# Patient Record
Sex: FEMALE | Race: WHITE | NOT HISPANIC OR LATINO | Employment: FULL TIME | URBAN - METROPOLITAN AREA
[De-identification: names, ages, dates, MRNs, and addresses within clinical notes are randomized per-mention and may not be internally consistent; named-entity substitution may affect disease eponyms.]

---

## 2018-08-16 ENCOUNTER — TRANSCRIBE ORDERS (OUTPATIENT)
Dept: PHYSICAL THERAPY | Facility: CLINIC | Age: 38
End: 2018-08-16

## 2018-08-16 ENCOUNTER — TREATMENT (OUTPATIENT)
Dept: PHYSICAL THERAPY | Facility: CLINIC | Age: 38
End: 2018-08-16

## 2018-08-16 DIAGNOSIS — M53.3 SI (SACROILIAC) JOINT DYSFUNCTION: Primary | ICD-10-CM

## 2018-08-16 DIAGNOSIS — M54.50 CHRONIC BILATERAL LOW BACK PAIN WITHOUT SCIATICA: Primary | ICD-10-CM

## 2018-08-16 DIAGNOSIS — G89.29 CHRONIC BILATERAL LOW BACK PAIN WITHOUT SCIATICA: Primary | ICD-10-CM

## 2018-08-16 PROCEDURE — 97161 PT EVAL LOW COMPLEX 20 MIN: CPT | Performed by: PHYSICAL THERAPIST

## 2018-08-16 PROCEDURE — 97110 THERAPEUTIC EXERCISES: CPT | Performed by: PHYSICAL THERAPIST

## 2018-08-16 NOTE — PROGRESS NOTES
Physical Therapy Initial Evaluation and Plan of Care      Patient: Antonieta Soto   : 1980  Diagnosis/ICD-10 Code:  SI (sacroiliac) joint dysfunction [M53.3]  Referring practitioner: Betsy Moser MD  Date of Initial Visit: 2018  Today's Date: 2018  Patient seen for 1 sessions           Subjective Questionnaire:Oswestry 2/50 (4%)      Subjective Evaluation    History of Present Illness  Mechanism of injury: Pain in gluteal/SI area/coccyx since an injury 15 years ago   Went to [facility] and got muscle relaxers   Has decreased the stress in her life and this has helped too  No pain today but the pain is intermittent throughout the month and worst around her menstrual cycle  The pain moves around her pelvic area    Subjective comment: L gluteal area/ SI/coccyx pain  Patient Occupation: full time grad student Quality of life: good    Pain  Current pain ratin  At best pain ratin  At worst pain ratin  Location: L gluteal area  Quality: dull ache and throbbing  Relieving factors: medications  Aggravating factors: standing, squatting and ambulation    Diagnostic Tests  X-ray: normal    Treatments  Previous treatment: medication  Current treatment: physical therapy  Patient Goals  Patient goals for therapy: decreased pain             Objective       Static Posture     Comments  L ASIS low, PSIS high, illiac crest high  L med malleolus low  Leg length 83 cm L and 82 cm R    Palpation     Additional Palpation Details  No tenderness to palpation    Lumbar Screen  Lumbar range of motion within normal limits.    Neurological Testing     Sensation     Hip   Left Hip   Intact: light touch    Right Hip   Intact: light touch    Active Range of Motion   Left Hip   Normal active range of motion    Right Hip   Normal active range of motion    Joint Play   Left Hip   Joints within functional limits are the posterior hip capsule, anterior hip capsule, lateral hip capsule and long axis  distraction.     Right Hip   Joints within functional limits are the posterior hip capsule, anterior hip capsule, lateral hip capsule and long axis distraction.     Strength/Myotome Testing     Left Hip   Planes of Motion   Extension: 4  Abduction: 4-    Right Hip   Planes of Motion   Extension: 4+  Abduction: 4+    Tests     Left Hip   Negative RAGHU, Gillet's, Ash and SI compression.     Right Hip   Negative RAGHU, Gillet's, Ash and SI compression.     Additional Tests Details  Flexibility WNL    Ambulation     Observational Gait     Additional Observational Gait Details  Normal gait with slight hip IR B    Functional Assessment   Squat   Left tibial anterior translation beyond toes and right tibial anterior translation beyond toes.     General Comments     Hip Comments   L leg length 83 cm  R leg length 82 cm         Assessment & Plan     Assessment  Impairments: impaired physical strength, lacks appropriate home exercise program and pain with function  Assessment details: Pt presents with increased pain and a rotated pelvic innominate with no tenderness at this time but reports tailbone and buttocks pain primarily on the L when she does have the pain.   Her deficits are minimal but she does internally rotate at B hips in standing and has some hip abduction weakness.  Her leg length measures 1cm with L leg longer than R likely due to her pelvic ant innominate rotation L.  She would benefit from skilled PT to help set up approp HEP and manual therapy and there ex to address her deficits for alignment of the pelvis and improved hip strength.   Prognosis: good  Functional Limitations: walking, uncomfortable because of pain, moving in bed, sitting and stooping  Goals  Plan Goals: STG (in 2 weeks)  1) Pt will report no pain since SOC when daily completing her ex  2) Pt will be able to complete HEP with no increase in pain and with proper technique  3) Pt will have equal pelvic landmarks indicating successful  alignment of the pelvis after manual therapy techniques    LTG (in 4 weeks)  1) Pt will be able to engage in normal workout routine with no pain   2) Pt will demonstrate improved hip abd/ext strength to 4+/5 in L hip  in order to stabilize the pelvis     Plan  Therapy options: will be seen for skilled physical therapy services  Planned modality interventions: high voltage pulsed current (pain management), high voltage pulsed current (spasm management), ultrasound, TENS, electrical stimulation/Russian stimulation and thermotherapy (hydrocollator packs)  Planned therapy interventions: abdominal trunk stabilization, functional ROM exercises, flexibility, postural training, neuromuscular re-education, manual therapy, soft tissue mobilization, joint mobilization, home exercise program, stretching, strengthening, spinal/joint mobilization, body mechanics training and balance/weight-bearing training  Frequency: 2x week  Duration in weeks: 4        Manual Therapy:    6     mins  38180;  Therapeutic Exercise:    16     mins  47299;     Neuromuscular Nichole:    0    mins  88696;    Therapeutic Activity:     0     mins  54885;     Gait Trainin     mins  98245;     Ultrasound:     0     mins  78497;    Electrical Stimulation:    0     mins  96912 ( );  Dry Needling     0     mins self-pay    Timed Treatment:   22   mins   Total Treatment:     47   mins    PT SIGNATURE: Kaylin Araujo, PT   DATE TREATMENT INITIATED: 2018    Initial Certification  Certification Period: 2018  I certify that the therapy services are furnished while this patient is under my care.  The services outlined above are required by this patient, and will be reviewed every 90 days.     PHYSICIAN: Betsy Moser MD      DATE:     Please sign and return via fax to [phone].. Thank you, [facility].

## 2018-08-23 ENCOUNTER — TREATMENT (OUTPATIENT)
Dept: PHYSICAL THERAPY | Facility: CLINIC | Age: 38
End: 2018-08-23

## 2018-08-23 PROCEDURE — 97110 THERAPEUTIC EXERCISES: CPT | Performed by: PHYSICAL THERAPIST

## 2018-08-23 NOTE — PROGRESS NOTES
Physical Therapy Daily Progress Note        Patient: Antonieta Soto   : 1980  Diagnosis/ICD-10 Code:  No primary diagnosis found.  Referring practitioner: Betsy Moser MD  Date of Initial Visit: Type: THERAPY  Noted: 2018  Today's Date: 2018  Patient seen for 2 sessions               Subjective Pt states she has been feeling good and likes the exercises    Objective   See Exercise, Manual, and Modality Logs for complete treatment.       Assessment/Plan Pt's pelvis and saccral landmarks are alligned today so no manual therapy indicated.  New ex added to progress core strength.        Manual Therapy:    0     mins  74419;  Therapeutic Exercise:    46     mins  33303;     Neuromuscular Nichole:    0    mins  97208;    Therapeutic Activity:     0     mins  66562;     Gait Trainin     mins  63999;     Ultrasound:     0     mins  24063;    Electrical Stimulation:    0     mins  97807 ( );  Dry Needling     0     mins self-pay    Timed Treatment:   46   mins   Total Treatment:     46   mins    Kaylin Araujo PT  Physical Therapist

## 2018-09-05 ENCOUNTER — TREATMENT (OUTPATIENT)
Dept: PHYSICAL THERAPY | Facility: CLINIC | Age: 38
End: 2018-09-05

## 2018-09-05 PROCEDURE — 97112 NEUROMUSCULAR REEDUCATION: CPT | Performed by: PHYSICAL THERAPIST

## 2018-09-05 PROCEDURE — G0283 ELEC STIM OTHER THAN WOUND: HCPCS | Performed by: PHYSICAL THERAPIST

## 2018-09-05 PROCEDURE — 97110 THERAPEUTIC EXERCISES: CPT | Performed by: PHYSICAL THERAPIST

## 2018-09-05 NOTE — PROGRESS NOTES
Physical Therapy Daily Progress Note        Patient: Antonieta Soto   : 1980  Diagnosis/ICD-10 Code:  No primary diagnosis found.  Referring practitioner: Betsy Moser MD  Date of Initial Visit: Type: THERAPY  Noted: 2018  Today's Date: 2018  Patient seen for 3 sessions               Subjective Pt states she has been feeling good with only 1/10 pain max even when she went on her vacation.  She has been taking B6, Vit D and Folic acid    Objective   See Exercise, Manual, and Modality Logs for complete treatment.       Assessment/Plan Estim added today since pt was reporting some tenderness at the L piriformis/glute med.  Pt progressing well as pelvic landmarks aligned so no manual performed.        Manual Therapy:    0     mins  11051;  Therapeutic Exercise:    14     mins  09998;     Neuromuscular Nichole:    8    mins  06859;    Therapeutic Activity:     0     mins  54787;     Gait Trainin     mins  73545;     Ultrasound:     0     mins  25118;    Electrical Stimulation:    15     mins  72389 ( );  Dry Needling     0     mins self-pay    Timed Treatment:   22   mins   Total Treatment:     40   mins    Kaylin Araujo PT  Physical Therapist

## 2018-09-10 ENCOUNTER — OFFICE VISIT (OUTPATIENT)
Dept: PHYSICAL THERAPY | Facility: CLINIC | Age: 38
End: 2018-09-10

## 2018-09-10 DIAGNOSIS — M53.3 SI (SACROILIAC) JOINT DYSFUNCTION: Primary | ICD-10-CM

## 2018-09-10 PROCEDURE — 97530 THERAPEUTIC ACTIVITIES: CPT | Performed by: PHYSICAL THERAPIST

## 2018-09-10 PROCEDURE — 97110 THERAPEUTIC EXERCISES: CPT | Performed by: PHYSICAL THERAPIST

## 2018-09-11 NOTE — PROGRESS NOTES
"Physical Therapy Daily Progress Note    Time In   Time Out     Antonieta Soto reports: low back/SI have been feeling better each and every time.  \" I think today will be my last time here\" states patient.    Subjective Evaluation    Pain  Current pain ratin  At best pain ratin           Objective       Palpation     Additional Palpation Details  No abnormal mm tightness/tone lumbar pvm    Tenderness     Left Hip   No tenderness in the PSIS.     Right Hip   No tenderness in the PSIS.     Active Range of Motion     Additional Active Range of Motion Details  Full active trunk motion all planes    Strength/Myotome Testing     Left Hip   Planes of Motion   Flexion: 5  Extension: 4+  Abduction: 4  Adduction: 4  External rotation: 4+  Internal rotation: 4+    Right Hip   Planes of Motion   Flexion: 5  Extension: 5  Abduction: 4+  Adduction: 4+  External rotation: 5  Internal rotation: 5    Left Knee   Flexion: 5  Extension: 5    Right Knee   Flexion: 5  Extension: 5    Left Ankle/Foot   Dorsiflexion: 5  Plantar flexion: 5  Inversion: 5  Eversion: 5    Right Ankle/Foot   Dorsiflexion: 5  Plantar flexion: 5  Inversion: 5  Eversion: 5     See Exercise, Manual, and Modality Logs for complete treatment.   Discussed importance of exercise continuation and progression/advancement.  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  Subjectively, patient reports essential resolution of symptoms back to baseline and capability to resume majority of previous functional level activities.  Objectively she presents with full pain free trunk " motion, no palpable tenderness left SI and improved L LE mm strength vs initial evaluation with only mild hip abd/add deficit noted.  Independent with HEP and understands need to continue performance/progression to maximize strength gains and decrease return of symptoms.  She has essentially met all goals and will be discharged from our care at this time.    Other  D/C to Independent HEP.  Will contact us if symptoms return and/or need for PT intervention is required.           Manual Therapy:         mins  80460;  Therapeutic Exercise:    30     mins  27157;     Neuromuscular Nichole:        mins  64354;    Therapeutic Activity:     20    mins  78748;     Gait Training:           mins  48317;     Ultrasound:          mins  06532;    Electrical Stimulation:         mins  74090 ( );      Timed Treatment:  50    mins   Total Treatment:   50     mins    Panchito Santana PTA    Physical Therapist Assistant [place] [id number]

## 2019-05-14 ENCOUNTER — APPOINTMENT (OUTPATIENT)
Dept: WOMENS IMAGING | Facility: HOSPITAL | Age: 39
End: 2019-05-14

## 2019-05-14 PROCEDURE — 77067 SCR MAMMO BI INCL CAD: CPT | Performed by: RADIOLOGY
